# Patient Record
(demographics unavailable — no encounter records)

---

## 2025-04-23 NOTE — HISTORY OF PRESENT ILLNESS
[FreeTextEntry1] : 04/23/2025: Ms. GRANDE is a 52-year-old female with h/o anemia from heavy menstruation   Pt reports she was evaluated for b/l abdominal pressure by a urologist.  Subsequently had CT AP w/IV on 10/4/24 showing  1) benign appearing right cortical cyst  2) sub centimeters hypodense left lesions. Too small to characterize but likely benign.  No acute or suspicious pathology or urinary tract   Denies h/o renal stones, Gh, or recurrent UTI Also, no h/o febrile/complicated UTI  Uirnary-wise patient has no complaints She voids regularly No straining/pushing to void No incontinence  Admits occasional mild constipation  She takes iron to help with anemia.   Never had a mammogram and has not seen GYN for a long time.   Never a smoker.

## 2025-04-23 NOTE — PHYSICAL EXAM
[Normal Appearance] : normal appearance [General Appearance - In No Acute Distress] : no acute distress [] : no respiratory distress [Normal Station and Gait] : the gait and station were normal for the patient's age [Skin Color & Pigmentation] : normal skin color and pigmentation [Oriented To Time, Place, And Person] : oriented to person, place, and time [Affect] : the affect was normal [Mood] : the mood was normal

## 2025-04-23 NOTE — ASSESSMENT
[FreeTextEntry1] :  CT AP w/IV on 10/4/24 showing  1) benign appearing right cortical cyst  2) sub centimeters hypodense left lesions. Too small to characterize but likely benign.  No acute or suspicious pathology or urinary tract   As per reports, kidneys are normal. Simple Cysts do not cause pain. They are benign in nature and we do not know why they form.    -Pt instructed to bring disc so images can be reviewed personally.   -Encouraged patient to manage bowels as that can lead to feeling of abdominal pressure Maintaining normal bowels is important:  I have suggested aggressive hydration, more fiber in diet (Benefiber), daily probiotics, and a daily stool softener with periodic Miralax.   -Instructed patient to see a OGBYN to assess her reproductive system Pt understands a urologist is not a substitute for one, and she needs one for  She expresses understanding    Pt will have US kidney and Bladder to assess urinary tract.   She will be called with the results.

## 2025-04-23 NOTE — ADDENDUM
[FreeTextEntry1] : This note was partly authored by Kade Medina working as a scribe for WARREN Russo. I, WARREN Russo, have reviewed the content of this note and confirm it is true and accurate. I personally performed the history and physical examination and made all the decisions. 04/23/2025.